# Patient Record
Sex: FEMALE | Race: WHITE | Employment: FULL TIME | URBAN - NONMETROPOLITAN AREA
[De-identification: names, ages, dates, MRNs, and addresses within clinical notes are randomized per-mention and may not be internally consistent; named-entity substitution may affect disease eponyms.]

---

## 2020-11-10 VITALS — HEIGHT: 67 IN

## 2020-11-10 SDOH — HEALTH STABILITY: MENTAL HEALTH: HOW OFTEN DO YOU HAVE A DRINK CONTAINING ALCOHOL?: NEVER

## 2020-12-09 ENCOUNTER — OFFICE VISIT (OUTPATIENT)
Dept: OBGYN CLINIC | Age: 50
End: 2020-12-09
Payer: COMMERCIAL

## 2020-12-09 ENCOUNTER — HOSPITAL ENCOUNTER (OUTPATIENT)
Age: 50
Setting detail: SPECIMEN
Discharge: HOME | End: 2020-12-09
Payer: COMMERCIAL

## 2020-12-09 VITALS
HEIGHT: 67 IN | SYSTOLIC BLOOD PRESSURE: 110 MMHG | BODY MASS INDEX: 18.52 KG/M2 | DIASTOLIC BLOOD PRESSURE: 62 MMHG | WEIGHT: 118 LBS

## 2020-12-09 PROCEDURE — 99396 PREV VISIT EST AGE 40-64: CPT | Performed by: ADVANCED PRACTICE MIDWIFE

## 2020-12-09 RX ORDER — FEXOFENADINE HCL 180 MG/1
TABLET ORAL
COMMUNITY

## 2020-12-09 ASSESSMENT — ENCOUNTER SYMPTOMS
EYES NEGATIVE: 1
RESPIRATORY NEGATIVE: 1
GASTROINTESTINAL NEGATIVE: 1

## 2020-12-09 NOTE — PROGRESS NOTES
YEARLY PHYSICAL    Date of service: 2020    Huang Jamison  Is a 53 y.o.   female    PT's PCP is: Soha Cuevas MD     : 1970                                             Subjective:       No LMP recorded. Patient has had an ablation.      Are your menses regular: not applicable    OB History    Para Term  AB Living   3 2 2   1 2   SAB TAB Ectopic Molar Multiple Live Births       1     2      # Outcome Date GA Lbr Kannan/2nd Weight Sex Delivery Anes PTL Lv   3 Term 02        MARIO ALBERTO   2 Term 98    M Vag-Spont   MARIO ALBERTO   1 Ectopic                 Social History     Tobacco Use   Smoking Status Current Every Day Smoker    Packs/day: 0.50    Types: Cigarettes   Smokeless Tobacco Never Used        Social History     Substance and Sexual Activity   Alcohol Use Yes    Frequency: Never    Comment: rare       Family History   Problem Relation Age of Onset    Heart Disease Paternal Grandfather     Heart Disease Paternal Grandmother     Heart Disease Maternal Grandmother        Any family history of breast or ovarian cancer: No    Any family history of blood clots: No      Allergies: Penicillins and Sulfa antibiotics      Current Outpatient Medications:     fexofenadine (ALLEGRA) 180 MG tablet, Take by mouth, Disp: , Rfl:     Loratadine (CLARITIN PO), Take by mouth, Disp: , Rfl:     Multiple Vitamin (MULTI-VITAMIN DAILY PO), Take by mouth daily, Disp: , Rfl:     Social History     Substance and Sexual Activity   Sexual Activity Yes    Partners: Male       Any bleeding or pain with intercourse: No    Last Yearly:  2019    Last pap: 2017    Last HPV: 2017    Last Mammogram: 2020    Last Dexascan n/a    Last colorectal screen- type: Has not had yet    Do you do self breast exams: Yes    Past Medical History:   Diagnosis Date    Abnormal Pap smear of cervix     Anxiety     Anxiety     COPD (chronic obstructive pulmonary disease) ([facility] [address]     new doctor states not true diagnosis and said emphysema instead- taveras    Decreased libido     Emphysema of lung ([facility] [address]     Herpes simplex virus (HSV) infection     Osteoarthritis     Panic attack     Panic attack     Systemic lupus erythematosus ([facility] [address]        Past Surgical History:   Procedure Laterality Date    ENDOMETRIAL ABLATION  2013    FOOT SURGERY Right 2013       Family History   Problem Relation Age of Onset    Heart Disease Paternal Grandfather     Heart Disease Paternal Grandmother     Heart Disease Maternal Grandmother        Chief Complaint   Patient presents with    Annual Exam     Annual. Last Pap 2018. Last mamm was Jan of this year. Doing well, no concerns at this time. Labs:    No results found for this visit on 12/09/20. HPI: Denies breast/pelvic concerns. Monogamous relationship. Due for pap smear. Mammogram screening is UTD. Has never had colonoscopy    Review of Systems   Constitutional: Negative. HENT: Negative. Eyes: Negative. Respiratory: Negative. Cardiovascular: Negative. Gastrointestinal: Negative. Endocrine: Negative. Genitourinary: Negative. Musculoskeletal: Negative. Skin: Negative. Neurological: Negative. Hematological: Negative. Objective  Blood pressure 110/62, height 5' 7\" (1.702 m), weight 118 lb (53.5 kg). Physical Exam  Constitutional:       Appearance: Normal appearance. She is normal weight. Genitourinary:      Pelvic exam was performed with patient in the lithotomy position. Vulva, inguinal canal, urethra, bladder, vagina, uterus, right adnexa and left adnexa normal.      Vaginal atrophic mucosa present. No cervical motion tenderness, friability or erythema. Uterus is not tender. Uterus is anteverted and regular. HENT:      Head: Normocephalic. Eyes:      Pupils: Pupils are equal, round, and reactive to light.    Neck:      Musculoskeletal: Normal

## 2020-12-15 LAB
HPV SOURCE: NORMAL
HPV, GENOTYPE 16: NOT DETECTED
HPV, GENOTYPE 18: NOT DETECTED
HPV, HIGH RISK OTHER: NOT DETECTED

## 2020-12-17 LAB — CYTOLOGY REPORT: NORMAL

## 2021-12-16 ENCOUNTER — TELEPHONE (OUTPATIENT)
Dept: OBGYN CLINIC | Age: 51
End: 2021-12-16

## 2021-12-16 ENCOUNTER — OFFICE VISIT (OUTPATIENT)
Dept: OBGYN CLINIC | Age: 51
End: 2021-12-16
Payer: COMMERCIAL

## 2021-12-16 VITALS
WEIGHT: 122.8 LBS | DIASTOLIC BLOOD PRESSURE: 60 MMHG | BODY MASS INDEX: 19.27 KG/M2 | SYSTOLIC BLOOD PRESSURE: 90 MMHG | HEIGHT: 67 IN

## 2021-12-16 DIAGNOSIS — Z01.419 SMEAR, VAGINAL, AS PART OF ROUTINE GYNECOLOGICAL EXAMINATION: Primary | ICD-10-CM

## 2021-12-16 DIAGNOSIS — R68.82 LOW LIBIDO: ICD-10-CM

## 2021-12-16 DIAGNOSIS — Z12.72 SMEAR, VAGINAL, AS PART OF ROUTINE GYNECOLOGICAL EXAMINATION: Primary | ICD-10-CM

## 2021-12-16 DIAGNOSIS — N64.4 NIPPLE PAIN: ICD-10-CM

## 2021-12-16 PROCEDURE — 99396 PREV VISIT EST AGE 40-64: CPT | Performed by: ADVANCED PRACTICE MIDWIFE

## 2021-12-16 RX ORDER — FLUCONAZOLE 150 MG/1
TABLET ORAL
COMMUNITY
Start: 2021-12-06

## 2021-12-16 ASSESSMENT — ENCOUNTER SYMPTOMS
SHORTNESS OF BREATH: 0
RESPIRATORY NEGATIVE: 1
ABDOMINAL PAIN: 0
CONSTIPATION: 0
DIARRHEA: 0
GASTROINTESTINAL NEGATIVE: 1

## 2021-12-16 NOTE — PROGRESS NOTES
YEARLY PHYSICAL    Date of service: 2021    Mark Anthony Waddell  Is a 49 y.o.   female    PT's PCP is: Yuliana Ohara MD     : 1970                                             Subjective:       No LMP recorded. Patient has had an ablation. Are your menses regular: not applicable    OB History    Para Term  AB Living   3 2 2   1 2   SAB IAB Ectopic Molar Multiple Live Births       1     2      # Outcome Date GA Lbr Kannan/2nd Weight Sex Delivery Anes PTL Lv   3 Term 02        MARIO ALBERTO   2 Term 98    M Vag-Spont   MARIO ALBERTO   1 Ectopic                 Social History     Tobacco Use   Smoking Status Current Every Day Smoker    Packs/day: 0.50    Types: Cigarettes   Smokeless Tobacco Never Used        Social History     Substance and Sexual Activity   Alcohol Use Yes    Comment: rare       Family History   Problem Relation Age of Onset    Heart Disease Paternal Grandfather     Heart Disease Paternal Grandmother     Heart Disease Maternal Grandmother        Any family history of breast or ovarian cancer: No    Any family history of blood clots: No      Allergies: Penicillins and Sulfa antibiotics      Current Outpatient Medications:     fluconazole (DIFLUCAN) 150 MG tablet, TAKE 1 TABLET BY MOUTH ONE TIME A WEEK, Disp: , Rfl:     fexofenadine (ALLEGRA) 180 MG tablet, Take by mouth, Disp: , Rfl:     Loratadine (CLARITIN PO), Take by mouth, Disp: , Rfl:     Multiple Vitamin (MULTI-VITAMIN DAILY PO), Take by mouth daily, Disp: , Rfl:     Social History     Substance and Sexual Activity   Sexual Activity Yes    Partners: Male       Any bleeding or pain with intercourse: Dryness if does not use lubricant    Last Yearly:      Last pap:  -normal    Last HPV: 2020 - negative    Last Mammogram:     Last colorectal screen- type: Has not had baseline - okay with referral for one.       Do you do self breast exams: Encouraged    Past Medical History:   Diagnosis Date    Abnormal Pap smear of cervix     Anxiety     Anxiety     COPD (chronic obstructive pulmonary disease) ([facility] [address]     new doctor states not true diagnosis and said emphysema instead- taveras    Decreased libido     Emphysema of lung (HCC)     Herpes simplex virus (HSV) infection     Osteoarthritis     Panic attack     Panic attack     Systemic lupus erythematosus ([facility] [address]        Past Surgical History:   Procedure Laterality Date    ENDOMETRIAL ABLATION  2013    FOOT SURGERY Right 2013       Family History   Problem Relation Age of Onset    Heart Disease Paternal Grandfather     Heart Disease Paternal Grandmother     Heart Disease Maternal Grandmother        Chief Complaint   Patient presents with    Annual Exam     Annual exam. Pap NL 12/9/20. Mamogram 1/20/21. Pt denies concerns. Labs:    No results found for this visit on 12/16/21. HPI: Annual exam.  Pap UTD. Having nipple sensitivity bilaterally - \"all the time\", no painful, \"gets me going just with a little touch\" - denies nipple drainage, denies rashes to nipples, onset of this x \"few years\". Mammogram is due to be done after 1/20/2022. Pelvic concerns - low libido - has used replens OTC and did have some relief for intercourse but get \"to wet\" and therefore more uncomfortable. Had labs for libido in 2018 - was rx testosterone but never filled the script. Cannot swallow pills    Review of Systems   Constitutional: Negative. Negative for chills, fatigue and fever. HENT: Negative. Respiratory: Negative. Negative for shortness of breath. Cardiovascular: Negative. Negative for chest pain. Gastrointestinal: Negative. Negative for abdominal pain, constipation and diarrhea. Genitourinary: Positive for dyspareunia. Negative for dysuria, enuresis, frequency, menstrual problem, pelvic pain, urgency and vaginal bleeding.         Nipples sensitive and low libido Musculoskeletal: Negative. Neurological: Negative. Negative for dizziness, light-headedness and headaches. Psychiatric/Behavioral: Negative. Objective  Blood pressure 90/60, height 5' 7\" (1.702 m), weight 122 lb 12.8 oz (55.7 kg). Physical Exam  Constitutional:       Appearance: Normal appearance. She is normal weight. Genitourinary:      Vulva, bladder and urethral meatus normal.      No vaginal discharge or tenderness. No vaginal prolapse present. Right Adnexa: not tender. Left Adnexa: not tender. No cervical motion tenderness or discharge. Uterus is not tender. Pelvic exam was performed with patient in the lithotomy position. Breasts: Breasts are symmetrical.      Breasts are soft. Right: No mass, nipple discharge, skin change or tenderness. Left: No mass, nipple discharge, skin change or tenderness. HENT:      Head: Normocephalic. Eyes:      Pupils: Pupils are equal, round, and reactive to light. Cardiovascular:      Rate and Rhythm: Normal rate and regular rhythm. Pulses: Normal pulses. Heart sounds: Normal heart sounds. No murmur heard. Pulmonary:      Effort: Pulmonary effort is normal.      Breath sounds: Normal breath sounds. No wheezing. Abdominal:      Palpations: Abdomen is soft. Tenderness: There is no abdominal tenderness. Musculoskeletal:         General: Normal range of motion. Cervical back: Normal range of motion. No muscular tenderness. Neurological:      Mental Status: She is alert and oriented to person, place, and time. Skin:     General: Skin is warm and dry. Psychiatric:         Behavior: Behavior normal.   Vitals and nursing note reviewed. Chaperone present: Declined. Assessment and Plan          Diagnosis Orders   1. Smear, vaginal, as part of routine gynecological examination     2. Low libido     3.  Nipple pain       Encouraged breast imaging as scheduled but nipple sensitivity \"leads me to a turn on\" is not a new onset - discussed possible hormone changes. Discussed options for libido - sex therapy, testosterone, Addyi - patient would like to see sex therapist - recommendation in lonny given. Will refer to surgeon in [address] for screening colonoscopy. Repeat Annual every 1 year  Cervical Cytology Evaluation begins at 24years old. If Negative Cytology, Follow-up screening per current guidelines. Mammograms every 1year. If 37 yo and last mammogram was negative. Calcium and Vitamin D dosing reviewed. Colonoscopy screening reviewed as well as onset for bone density testing. Birth control and barrier recommendationsdiscussed. STD counseling and prevention reviewed. Gardisil counseling completed for all patients. Routine healthmaintenance per patients PCP. I am having [place]. Jeevan maintain her Loratadine (CLARITIN PO), Multiple Vitamin (MULTI-VITAMIN DAILY PO), fexofenadine, and fluconazole. Return in about 1 year (around 12/16/2022) for Yearly. She was also counseled on her preventative health maintenance recommendations and follow-up. There are no Patient Instructions on file for this visit.     KELLI Oh CNM,12/16/2021 9:30 AM

## 2023-01-23 ENCOUNTER — OFFICE VISIT (OUTPATIENT)
Dept: OBGYN CLINIC | Age: 53
End: 2023-01-23
Payer: COMMERCIAL

## 2023-01-23 ENCOUNTER — HOSPITAL ENCOUNTER (OUTPATIENT)
Age: 53
Setting detail: SPECIMEN
Discharge: HOME | End: 2023-01-23

## 2023-01-23 VITALS
WEIGHT: 127 LBS | BODY MASS INDEX: 19.93 KG/M2 | HEIGHT: 67 IN | DIASTOLIC BLOOD PRESSURE: 68 MMHG | SYSTOLIC BLOOD PRESSURE: 102 MMHG

## 2023-01-23 DIAGNOSIS — Z01.419 SMEAR, VAGINAL, AS PART OF ROUTINE GYNECOLOGICAL EXAMINATION: Primary | ICD-10-CM

## 2023-01-23 DIAGNOSIS — Z12.72 SMEAR, VAGINAL, AS PART OF ROUTINE GYNECOLOGICAL EXAMINATION: Primary | ICD-10-CM

## 2023-01-23 DIAGNOSIS — Z12.4 SCREENING FOR CERVICAL CANCER: ICD-10-CM

## 2023-01-23 PROCEDURE — 99396 PREV VISIT EST AGE 40-64: CPT | Performed by: ADVANCED PRACTICE MIDWIFE

## 2023-01-23 RX ORDER — KETOCONAZOLE 20 MG/G
CREAM TOPICAL
COMMUNITY
Start: 2022-11-11 | End: 2023-01-23 | Stop reason: ALTCHOICE

## 2023-01-23 ASSESSMENT — ENCOUNTER SYMPTOMS
CONSTIPATION: 0
GASTROINTESTINAL NEGATIVE: 1
RESPIRATORY NEGATIVE: 1
SHORTNESS OF BREATH: 0
ABDOMINAL PAIN: 0
DIARRHEA: 0

## 2023-01-23 NOTE — PROGRESS NOTES
Chaperone for Intimate Exam  Chaperone was offered and accepted as part of the rooming process.   Chaperone: David Guerrero

## 2023-01-23 NOTE — PROGRESS NOTES
YEARLY PHYSICAL    Date of service: 2023    Alvino Holm  Is a 47 y.o.   female    PT's PCP is: Heather Villeda MD     : 1970                                             Subjective:       No LMP recorded. Patient has had an ablation.      Are your menses regular: no    OB History    Para Term  AB Living   3 2 2   1 2   SAB IAB Ectopic Molar Multiple Live Births       1     2      # Outcome Date GA Lbr Kannan/2nd Weight Sex Delivery Anes PTL Lv   3 Term 02        MARIO ALBERTO   2 Term 98    M Vag-Spont   MARIO ALBERTO   1 Ectopic                 Social History     Tobacco Use   Smoking Status Every Day    Packs/day: 0.50    Types: Cigarettes   Smokeless Tobacco Never        Social History     Substance and Sexual Activity   Alcohol Use Yes    Comment: rare       Family History   Problem Relation Age of Onset    Heart Disease Paternal Grandfather     Heart Disease Paternal Grandmother     Heart Disease Maternal Grandmother        Any family history of breast or ovarian cancer: No    Any family history of blood clots: No      Allergies: Penicillins and Sulfa antibiotics      Current Outpatient Medications:     fexofenadine (ALLEGRA) 180 MG tablet, Take by mouth, Disp: , Rfl:     Loratadine (CLARITIN PO), Take by mouth, Disp: , Rfl:     Multiple Vitamin (MULTI-VITAMIN DAILY PO), Take by mouth daily, Disp: , Rfl:     Social History     Substance and Sexual Activity   Sexual Activity Yes    Partners: Male       Any bleeding or pain with intercourse: No    Last Yearly:      Last pap: 2020 - normal    Last HPV: 2020 - negative    Last Mammogram: due    Last colorectal screen- type: due    Do you do self breast exams: Encouraged    Past Medical History:   Diagnosis Date    Abnormal Pap smear of cervix     Anxiety     Anxiety     COPD (chronic obstructive pulmonary disease) ([facility] [address]     new doctor states not true diagnosis and said emphysema instead- taveras    Decreased libido     Emphysema of lung (HCC)     Herpes simplex virus (HSV) infection     Osteoarthritis     Panic attack     Panic attack     Systemic lupus erythematosus ([facility] [address]        Past Surgical History:   Procedure Laterality Date    ENDOMETRIAL ABLATION  2013    FOOT SURGERY Right 2013       Family History   Problem Relation Age of Onset    Heart Disease Paternal Grandfather     Heart Disease Paternal Grandmother     Heart Disease Maternal Grandmother        Chief Complaint   Patient presents with    Annual Exam     Pap NL 12/9/20. Mammogram due. Colonoscopy due. Pt denies concerns. Labs:    No results found for this visit on 01/23/23. HPI: Annual.  Denies breast/pelvic concerns. Due for pap, mammogram, and colonoscopy. Spouse with patient for exam.  Monogamous relationship    Review of Systems   Constitutional: Negative. Negative for chills, fatigue and fever. HENT: Negative. Respiratory: Negative. Negative for shortness of breath. Cardiovascular: Negative. Negative for chest pain. Gastrointestinal: Negative. Negative for abdominal pain, constipation and diarrhea. Genitourinary:  Negative for dysuria, enuresis, frequency, menstrual problem, pelvic pain, urgency and vaginal bleeding. Musculoskeletal: Negative. Neurological: Negative. Negative for dizziness, light-headedness and headaches. Psychiatric/Behavioral: Negative. Objective  Blood pressure 102/68, height 5' 7\" (1.702 m), weight 127 lb (57.6 kg). Physical Exam  Constitutional:       Appearance: Normal appearance. She is normal weight. Genitourinary:      Vulva, bladder and urethral meatus normal.      No vaginal discharge or tenderness. No vaginal prolapse present. Right Adnexa: not tender. Left Adnexa: not tender. No cervical motion tenderness or discharge. Uterus is not tender. Pelvic exam was performed with patient in the lithotomy position. Breasts:     Breasts are symmetrical.      Breasts are soft. Right: No mass, nipple discharge, skin change or tenderness. Left: No mass, nipple discharge, skin change or tenderness. HENT:      Head: Normocephalic. Eyes:      Pupils: Pupils are equal, round, and reactive to light. Cardiovascular:      Rate and Rhythm: Normal rate and regular rhythm. Pulses: Normal pulses. Heart sounds: Normal heart sounds. No murmur heard. Pulmonary:      Effort: Pulmonary effort is normal.      Breath sounds: Normal breath sounds. No wheezing. Abdominal:      Palpations: Abdomen is soft. Tenderness: There is no abdominal tenderness. Musculoskeletal:         General: Normal range of motion. Cervical back: Normal range of motion. No muscular tenderness. Neurological:      Mental Status: She is alert and oriented to person, place, and time. Skin:     General: Skin is warm and dry. Psychiatric:         Behavior: Behavior normal.   Vitals and nursing note reviewed. Chaperone present: [address] here for exam.                               Assessment and Plan          Diagnosis Orders   1. Smear, vaginal, as part of routine gynecological examination        2. Screening for cervical cancer            Repeat Annual every 1 year  Cervical Cytology Evaluation begins at 24years old. If Negative Cytology, Follow-up screening per current guidelines. Mammograms every 1year. If 37 yo and last mammogram was negative. Calcium and Vitamin D dosing reviewed. Birth control and barrier recommendationsdiscussed. STD counseling and prevention reviewed. Routine healthmaintenance per patients PCP. I have discontinued Maria Del Carmen Chew's fluconazole and ketoconazole. I am also having her maintain her Loratadine (CLARITIN PO), Multiple Vitamin (MULTI-VITAMIN DAILY PO), and fexofenadine. Return in about 1 year (around 1/23/2024) for Yearly.     She was also counseled on her preventative health maintenance recommendations and follow-up. There are no Patient Instructions on file for this visit.     KELLI العلي - CESAR,1/23/2023 1:31 PM

## 2023-01-24 DIAGNOSIS — Z01.419 SMEAR, VAGINAL, AS PART OF ROUTINE GYNECOLOGICAL EXAMINATION: Primary | ICD-10-CM

## 2023-01-24 DIAGNOSIS — Z12.72 SMEAR, VAGINAL, AS PART OF ROUTINE GYNECOLOGICAL EXAMINATION: Primary | ICD-10-CM

## 2023-01-25 LAB
HPV SAMPLE: NORMAL
HPV, GENOTYPE 16: NOT DETECTED
HPV, GENOTYPE 18: NOT DETECTED
HPV, HIGH RISK OTHER: NOT DETECTED
HPV, INTERPRETATION: NORMAL
SPECIMEN DESCRIPTION: NORMAL

## 2024-01-29 ENCOUNTER — OFFICE VISIT (OUTPATIENT)
Dept: OBGYN CLINIC | Age: 54
End: 2024-01-29
Payer: COMMERCIAL

## 2024-01-29 VITALS
SYSTOLIC BLOOD PRESSURE: 110 MMHG | HEIGHT: 67 IN | BODY MASS INDEX: 19.3 KG/M2 | DIASTOLIC BLOOD PRESSURE: 70 MMHG | WEIGHT: 123 LBS

## 2024-01-29 DIAGNOSIS — Z01.419 SMEAR, VAGINAL, AS PART OF ROUTINE GYNECOLOGICAL EXAMINATION: Primary | ICD-10-CM

## 2024-01-29 DIAGNOSIS — Z12.72 SMEAR, VAGINAL, AS PART OF ROUTINE GYNECOLOGICAL EXAMINATION: Primary | ICD-10-CM

## 2024-01-29 PROCEDURE — 99396 PREV VISIT EST AGE 40-64: CPT | Performed by: ADVANCED PRACTICE MIDWIFE

## 2024-01-29 ASSESSMENT — ENCOUNTER SYMPTOMS
SHORTNESS OF BREATH: 0
DIARRHEA: 0
CONSTIPATION: 0
GASTROINTESTINAL NEGATIVE: 1
RESPIRATORY NEGATIVE: 1
ABDOMINAL PAIN: 0

## 2024-01-29 NOTE — PROGRESS NOTES
Chaperone for Intimate Exam  Chaperone was offered as part of the rooming process. Patient declined and agrees to continue with exam without a chaperone.       
diagnosis and said emphysema instead- taveras    Decreased libido     Emphysema of lung (HCC)     Herpes simplex virus (HSV) infection     Osteoarthritis     Panic attack     Panic attack     Systemic lupus erythematosus (HCC)        Past Surgical History:   Procedure Laterality Date    ENDOMETRIAL ABLATION  2013    FOOT SURGERY Right 2013       Family History   Problem Relation Age of Onset    Heart Disease Paternal Grandfather     Heart Disease Paternal Grandmother     Heart Disease Maternal Grandmother        Chief Complaint   Patient presents with    Annual Exam     Pap NL 1/23/23. Mammogram due. Pt denies concerns.        Labs:    No results found for this visit on 01/29/24.      HPI:  Annual.  Here with .  Denies breast concerns or pelvic concerns.  No menses since ablation.  Monogamous relationship.  Pap smear normal/negative in 2023.  Due for mammogram    Review of Systems   Constitutional: Negative.  Negative for chills, fatigue and fever.   HENT: Negative.     Respiratory: Negative.  Negative for shortness of breath.    Cardiovascular: Negative.  Negative for chest pain.   Gastrointestinal: Negative.  Negative for abdominal pain, constipation and diarrhea.   Genitourinary:  Negative for dysuria, enuresis, frequency, menstrual problem, pelvic pain, urgency and vaginal bleeding.   Musculoskeletal: Negative.    Neurological: Negative.  Negative for dizziness, light-headedness and headaches.   Psychiatric/Behavioral: Negative.           Objective  Blood pressure 110/70, height 1.702 m (5' 7\"), weight 55.8 kg (123 lb).  Physical Exam  Constitutional:       Appearance: Normal appearance. She is normal weight.   Genitourinary:      Vulva, bladder and urethral meatus normal.      No vaginal discharge or tenderness.      No vaginal prolapse present.       Right Adnexa: not tender.     Left Adnexa: not tender.     No cervical motion tenderness or discharge.      Uterus is not tender.      Pelvic exam was

## 2025-02-02 NOTE — PROGRESS NOTES
YEARLY PHYSICAL    Date of service: 2/3/2025    Maria Del Carmen Chew  Is a 54 y.o.   female    PT's PCP is: Meliza Davalos MD     : 1970                                             Subjective:       No LMP recorded. Patient has had an ablation.     Are your menses regular: not applicable    OB History    Para Term  AB Living   3 2 2   1 2   SAB IAB Ectopic Molar Multiple Live Births       1     2      # Outcome Date GA Lbr Kannan/2nd Weight Sex Type Anes PTL Lv   3 Term 02        MARIO ALBERTO   2 Term 98    M Vag-Spont   MARIO ALBERTO   1 Ectopic                 Social History     Tobacco Use   Smoking Status Every Day    Current packs/day: 0.50    Types: Cigarettes   Smokeless Tobacco Never        Social History     Substance and Sexual Activity   Alcohol Use Yes    Comment: rare       Family History   Problem Relation Age of Onset    Heart Disease Paternal Grandfather     Heart Disease Paternal Grandmother     Heart Disease Maternal Grandmother        Any family history of breast or ovarian cancer: No    Any family history of blood clots: No      Allergies: Penicillins and Sulfa antibiotics      Current Outpatient Medications:     fexofenadine (ALLEGRA) 180 MG tablet, Take by mouth, Disp: , Rfl:     Loratadine (CLARITIN PO), Take by mouth, Disp: , Rfl:     Multiple Vitamin (MULTI-VITAMIN DAILY PO), Take by mouth daily, Disp: , Rfl:     Social History     Substance and Sexual Activity   Sexual Activity Yes    Partners: Male       Any bleeding or pain with intercourse: No    Last Yearly:      Last pap:  - normal    Last HPV:  - negative    Last Mammogram: 2024    Last colorectal screen- type: 2023    Do you do self breast exams: Encouraged    Past Medical History:   Diagnosis Date    Abnormal Pap smear of cervix     Anxiety     Anxiety     COPD (chronic obstructive pulmonary disease) (HCC)     new doctor states not true

## 2025-02-03 ENCOUNTER — OFFICE VISIT (OUTPATIENT)
Dept: OBGYN CLINIC | Age: 55
End: 2025-02-03
Payer: COMMERCIAL

## 2025-02-03 VITALS
DIASTOLIC BLOOD PRESSURE: 72 MMHG | BODY MASS INDEX: 18.83 KG/M2 | HEIGHT: 67 IN | SYSTOLIC BLOOD PRESSURE: 112 MMHG | WEIGHT: 120 LBS

## 2025-02-03 DIAGNOSIS — Z12.72 SMEAR, VAGINAL, AS PART OF ROUTINE GYNECOLOGICAL EXAMINATION: Primary | ICD-10-CM

## 2025-02-03 DIAGNOSIS — Z01.419 SMEAR, VAGINAL, AS PART OF ROUTINE GYNECOLOGICAL EXAMINATION: Primary | ICD-10-CM

## 2025-02-03 PROCEDURE — 99396 PREV VISIT EST AGE 40-64: CPT | Performed by: ADVANCED PRACTICE MIDWIFE

## 2025-02-03 SDOH — ECONOMIC STABILITY: FOOD INSECURITY: WITHIN THE PAST 12 MONTHS, THE FOOD YOU BOUGHT JUST DIDN'T LAST AND YOU DIDN'T HAVE MONEY TO GET MORE.: NEVER TRUE

## 2025-02-03 SDOH — ECONOMIC STABILITY: FOOD INSECURITY: WITHIN THE PAST 12 MONTHS, YOU WORRIED THAT YOUR FOOD WOULD RUN OUT BEFORE YOU GOT MONEY TO BUY MORE.: NEVER TRUE
